# Patient Record
Sex: MALE | Race: WHITE | NOT HISPANIC OR LATINO | Employment: FULL TIME | URBAN - METROPOLITAN AREA
[De-identification: names, ages, dates, MRNs, and addresses within clinical notes are randomized per-mention and may not be internally consistent; named-entity substitution may affect disease eponyms.]

---

## 2024-08-27 ENCOUNTER — HOSPITAL ENCOUNTER (OUTPATIENT)
Facility: HOSPITAL | Age: 24
Discharge: HOME | End: 2024-08-28
Attending: EMERGENCY MEDICINE | Admitting: SURGERY

## 2024-08-27 DIAGNOSIS — S68.119A TRAUMATIC AMPUTATION OF FINGER TIP, INITIAL ENCOUNTER: Primary | ICD-10-CM

## 2024-08-27 DIAGNOSIS — S62.639B: ICD-10-CM

## 2024-08-27 PROCEDURE — 25000003 PHARM REV CODE 250: Performed by: EMERGENCY MEDICINE

## 2024-08-27 PROCEDURE — 99285 EMERGENCY DEPT VISIT HI MDM: CPT

## 2024-08-27 RX ORDER — OXYCODONE AND ACETAMINOPHEN 5; 325 MG/1; MG/1
1 TABLET ORAL
Status: COMPLETED | OUTPATIENT
Start: 2024-08-27 | End: 2024-08-27

## 2024-08-27 RX ORDER — CEFAZOLIN SODIUM 2 G/50ML
2 SOLUTION INTRAVENOUS
Status: DISCONTINUED | OUTPATIENT
Start: 2024-08-28 | End: 2024-08-28 | Stop reason: HOSPADM

## 2024-08-27 RX ADMIN — OXYCODONE HYDROCHLORIDE AND ACETAMINOPHEN 1 TABLET: 5; 325 TABLET ORAL at 10:08

## 2024-08-28 ENCOUNTER — ANESTHESIA EVENT (OUTPATIENT)
Dept: SURGERY | Facility: HOSPITAL | Age: 24
End: 2024-08-28

## 2024-08-28 ENCOUNTER — ANESTHESIA (OUTPATIENT)
Dept: SURGERY | Facility: HOSPITAL | Age: 24
End: 2024-08-28

## 2024-08-28 PROBLEM — S62.639B: Status: ACTIVE | Noted: 2024-08-28

## 2024-08-28 LAB
ALBUMIN SERPL-MCNC: 3.8 G/DL (ref 3.5–5)
ALBUMIN/GLOB SERPL: 1.5 RATIO (ref 1.1–2)
ALP SERPL-CCNC: 59 UNIT/L (ref 40–150)
ALT SERPL-CCNC: 23 UNIT/L (ref 0–55)
ANION GAP SERPL CALC-SCNC: 10 MEQ/L
AST SERPL-CCNC: 27 UNIT/L (ref 5–34)
BASOPHILS # BLD AUTO: 0.03 X10(3)/MCL
BASOPHILS NFR BLD AUTO: 0.4 %
BILIRUB SERPL-MCNC: 0.6 MG/DL
BUN SERPL-MCNC: 16.1 MG/DL (ref 8.9–20.6)
CALCIUM SERPL-MCNC: 9 MG/DL (ref 8.4–10.2)
CHLORIDE SERPL-SCNC: 106 MMOL/L (ref 98–107)
CO2 SERPL-SCNC: 24 MMOL/L (ref 22–29)
CREAT SERPL-MCNC: 0.9 MG/DL (ref 0.73–1.18)
CREAT/UREA NIT SERPL: 18
EOSINOPHIL # BLD AUTO: 0.14 X10(3)/MCL (ref 0–0.9)
EOSINOPHIL NFR BLD AUTO: 1.8 %
ERYTHROCYTE [DISTWIDTH] IN BLOOD BY AUTOMATED COUNT: 13.8 % (ref 11.5–17)
GFR SERPLBLD CREATININE-BSD FMLA CKD-EPI: >60 ML/MIN/1.73/M2
GLOBULIN SER-MCNC: 2.6 GM/DL (ref 2.4–3.5)
GLUCOSE SERPL-MCNC: 91 MG/DL (ref 74–100)
HCT VFR BLD AUTO: 39.8 % (ref 42–52)
HGB BLD-MCNC: 13.2 G/DL (ref 14–18)
IMM GRANULOCYTES # BLD AUTO: 0.03 X10(3)/MCL (ref 0–0.04)
IMM GRANULOCYTES NFR BLD AUTO: 0.4 %
LYMPHOCYTES # BLD AUTO: 2.18 X10(3)/MCL (ref 0.6–4.6)
LYMPHOCYTES NFR BLD AUTO: 28.6 %
MAGNESIUM SERPL-MCNC: 2.5 MG/DL (ref 1.6–2.6)
MCH RBC QN AUTO: 29.7 PG (ref 27–31)
MCHC RBC AUTO-ENTMCNC: 33.2 G/DL (ref 33–36)
MCV RBC AUTO: 89.4 FL (ref 80–94)
MONOCYTES # BLD AUTO: 0.7 X10(3)/MCL (ref 0.1–1.3)
MONOCYTES NFR BLD AUTO: 9.2 %
NEUTROPHILS # BLD AUTO: 4.53 X10(3)/MCL (ref 2.1–9.2)
NEUTROPHILS NFR BLD AUTO: 59.6 %
NRBC BLD AUTO-RTO: 0 %
PHOSPHATE SERPL-MCNC: 4.7 MG/DL (ref 2.3–4.7)
PLATELET # BLD AUTO: 252 X10(3)/MCL (ref 130–400)
PMV BLD AUTO: 10.3 FL (ref 7.4–10.4)
POTASSIUM SERPL-SCNC: 3.7 MMOL/L (ref 3.5–5.1)
PROT SERPL-MCNC: 6.4 GM/DL (ref 6.4–8.3)
RBC # BLD AUTO: 4.45 X10(6)/MCL (ref 4.7–6.1)
SODIUM SERPL-SCNC: 140 MMOL/L (ref 136–145)
WBC # BLD AUTO: 7.61 X10(3)/MCL (ref 4.5–11.5)

## 2024-08-28 PROCEDURE — 63600175 PHARM REV CODE 636 W HCPCS: Performed by: ANESTHESIOLOGY

## 2024-08-28 PROCEDURE — 99204 OFFICE O/P NEW MOD 45 MIN: CPT | Mod: 57,,, | Performed by: ORTHOPAEDIC SURGERY

## 2024-08-28 PROCEDURE — 63600175 PHARM REV CODE 636 W HCPCS: Performed by: PHYSICIAN ASSISTANT

## 2024-08-28 PROCEDURE — 63600175 PHARM REV CODE 636 W HCPCS

## 2024-08-28 PROCEDURE — G0378 HOSPITAL OBSERVATION PER HR: HCPCS

## 2024-08-28 PROCEDURE — 71000015 HC POSTOP RECOV 1ST HR: Performed by: ORTHOPAEDIC SURGERY

## 2024-08-28 PROCEDURE — 26951 AMPUTATION OF FINGER/THUMB: CPT | Mod: F9,,, | Performed by: ORTHOPAEDIC SURGERY

## 2024-08-28 PROCEDURE — 71000033 HC RECOVERY, INTIAL HOUR: Performed by: ORTHOPAEDIC SURGERY

## 2024-08-28 PROCEDURE — 63600175 PHARM REV CODE 636 W HCPCS: Mod: JZ,JG | Performed by: ORTHOPAEDIC SURGERY

## 2024-08-28 PROCEDURE — 37000009 HC ANESTHESIA EA ADD 15 MINS: Performed by: ORTHOPAEDIC SURGERY

## 2024-08-28 PROCEDURE — 25000003 PHARM REV CODE 250

## 2024-08-28 PROCEDURE — 37000008 HC ANESTHESIA 1ST 15 MINUTES: Performed by: ORTHOPAEDIC SURGERY

## 2024-08-28 PROCEDURE — 71000016 HC POSTOP RECOV ADDL HR: Performed by: ORTHOPAEDIC SURGERY

## 2024-08-28 PROCEDURE — 96365 THER/PROPH/DIAG IV INF INIT: CPT

## 2024-08-28 PROCEDURE — 96366 THER/PROPH/DIAG IV INF ADDON: CPT

## 2024-08-28 PROCEDURE — 36000706: Performed by: ORTHOPAEDIC SURGERY

## 2024-08-28 PROCEDURE — 36000707: Performed by: ORTHOPAEDIC SURGERY

## 2024-08-28 PROCEDURE — 36000705 HC OR TIME LEV I EA ADD 15 MIN: Performed by: ORTHOPAEDIC SURGERY

## 2024-08-28 PROCEDURE — 25000003 PHARM REV CODE 250: Performed by: ANESTHESIOLOGY

## 2024-08-28 PROCEDURE — 85025 COMPLETE CBC W/AUTO DIFF WBC: CPT

## 2024-08-28 PROCEDURE — 96372 THER/PROPH/DIAG INJ SC/IM: CPT

## 2024-08-28 PROCEDURE — 83735 ASSAY OF MAGNESIUM: CPT

## 2024-08-28 PROCEDURE — 63600175 PHARM REV CODE 636 W HCPCS: Performed by: EMERGENCY MEDICINE

## 2024-08-28 PROCEDURE — 36000704 HC OR TIME LEV I 1ST 15 MIN: Performed by: ORTHOPAEDIC SURGERY

## 2024-08-28 PROCEDURE — 80053 COMPREHEN METABOLIC PANEL: CPT

## 2024-08-28 PROCEDURE — 84100 ASSAY OF PHOSPHORUS: CPT

## 2024-08-28 RX ORDER — PROPOFOL 10 MG/ML
INJECTION, EMULSION INTRAVENOUS
Status: DISCONTINUED | OUTPATIENT
Start: 2024-08-28 | End: 2024-08-28

## 2024-08-28 RX ORDER — FENTANYL CITRATE 50 UG/ML
INJECTION, SOLUTION INTRAMUSCULAR; INTRAVENOUS
Status: DISCONTINUED | OUTPATIENT
Start: 2024-08-28 | End: 2024-08-28

## 2024-08-28 RX ORDER — CEFAZOLIN SODIUM 2 G/50ML
2 SOLUTION INTRAVENOUS ONCE
Status: COMPLETED | OUTPATIENT
Start: 2024-08-28 | End: 2024-08-28

## 2024-08-28 RX ORDER — GABAPENTIN 300 MG/1
300 CAPSULE ORAL 3 TIMES DAILY
Status: DISCONTINUED | OUTPATIENT
Start: 2024-08-28 | End: 2024-08-28 | Stop reason: HOSPADM

## 2024-08-28 RX ORDER — SODIUM CHLORIDE, SODIUM LACTATE, POTASSIUM CHLORIDE, CALCIUM CHLORIDE 600; 310; 30; 20 MG/100ML; MG/100ML; MG/100ML; MG/100ML
INJECTION, SOLUTION INTRAVENOUS CONTINUOUS
Status: DISCONTINUED | OUTPATIENT
Start: 2024-08-28 | End: 2024-08-28 | Stop reason: HOSPADM

## 2024-08-28 RX ORDER — KETOROLAC TROMETHAMINE 10 MG/1
10 TABLET, FILM COATED ORAL EVERY 6 HOURS PRN
Qty: 20 TABLET | Refills: 0 | Status: SHIPPED | OUTPATIENT
Start: 2024-08-28 | End: 2024-09-02

## 2024-08-28 RX ORDER — IPRATROPIUM BROMIDE AND ALBUTEROL SULFATE 2.5; .5 MG/3ML; MG/3ML
3 SOLUTION RESPIRATORY (INHALATION) ONCE AS NEEDED
Status: DISCONTINUED | OUTPATIENT
Start: 2024-08-28 | End: 2024-08-28 | Stop reason: HOSPADM

## 2024-08-28 RX ORDER — ACETAMINOPHEN 325 MG/1
650 TABLET ORAL EVERY 4 HOURS
Status: DISCONTINUED | OUTPATIENT
Start: 2024-08-28 | End: 2024-08-28 | Stop reason: HOSPADM

## 2024-08-28 RX ORDER — HYDROMORPHONE HYDROCHLORIDE 2 MG/ML
0.5 INJECTION, SOLUTION INTRAMUSCULAR; INTRAVENOUS; SUBCUTANEOUS EVERY 5 MIN PRN
Status: DISCONTINUED | OUTPATIENT
Start: 2024-08-28 | End: 2024-08-28 | Stop reason: HOSPADM

## 2024-08-28 RX ORDER — MIDAZOLAM HYDROCHLORIDE 1 MG/ML
INJECTION INTRAMUSCULAR; INTRAVENOUS
Status: DISCONTINUED | OUTPATIENT
Start: 2024-08-28 | End: 2024-08-28

## 2024-08-28 RX ORDER — MEPERIDINE HYDROCHLORIDE 25 MG/ML
12.5 INJECTION INTRAMUSCULAR; INTRAVENOUS; SUBCUTANEOUS ONCE
Status: DISCONTINUED | OUTPATIENT
Start: 2024-08-28 | End: 2024-08-28 | Stop reason: HOSPADM

## 2024-08-28 RX ORDER — METOCLOPRAMIDE HYDROCHLORIDE 5 MG/ML
10 INJECTION INTRAMUSCULAR; INTRAVENOUS EVERY 10 MIN PRN
Status: DISCONTINUED | OUTPATIENT
Start: 2024-08-28 | End: 2024-08-28 | Stop reason: HOSPADM

## 2024-08-28 RX ORDER — THIAMINE HCL 100 MG
100 TABLET ORAL DAILY
Status: DISCONTINUED | OUTPATIENT
Start: 2024-08-28 | End: 2024-08-28 | Stop reason: HOSPADM

## 2024-08-28 RX ORDER — DEXAMETHASONE SODIUM PHOSPHATE 4 MG/ML
INJECTION, SOLUTION INTRA-ARTICULAR; INTRALESIONAL; INTRAMUSCULAR; INTRAVENOUS; SOFT TISSUE
Status: DISCONTINUED | OUTPATIENT
Start: 2024-08-28 | End: 2024-08-28

## 2024-08-28 RX ORDER — ONDANSETRON HYDROCHLORIDE 2 MG/ML
INJECTION, SOLUTION INTRAVENOUS
Status: DISCONTINUED | OUTPATIENT
Start: 2024-08-28 | End: 2024-08-28

## 2024-08-28 RX ORDER — FOLIC ACID 1 MG/1
1 TABLET ORAL DAILY
Status: DISCONTINUED | OUTPATIENT
Start: 2024-08-28 | End: 2024-08-28 | Stop reason: HOSPADM

## 2024-08-28 RX ORDER — METOCLOPRAMIDE HYDROCHLORIDE 5 MG/ML
10 INJECTION INTRAMUSCULAR; INTRAVENOUS ONCE
Status: COMPLETED | OUTPATIENT
Start: 2024-08-28 | End: 2024-08-28

## 2024-08-28 RX ORDER — METHOCARBAMOL 500 MG/1
500 TABLET, FILM COATED ORAL EVERY 8 HOURS
Status: DISCONTINUED | OUTPATIENT
Start: 2024-08-28 | End: 2024-08-28 | Stop reason: HOSPADM

## 2024-08-28 RX ORDER — OXYCODONE HYDROCHLORIDE 5 MG/1
5 TABLET ORAL EVERY 4 HOURS PRN
Status: DISCONTINUED | OUTPATIENT
Start: 2024-08-28 | End: 2024-08-28 | Stop reason: HOSPADM

## 2024-08-28 RX ORDER — METHOCARBAMOL 750 MG/1
750 TABLET, FILM COATED ORAL 3 TIMES DAILY
Qty: 30 TABLET | Refills: 0 | Status: SHIPPED | OUTPATIENT
Start: 2024-08-28 | End: 2024-09-07

## 2024-08-28 RX ORDER — ENOXAPARIN SODIUM 100 MG/ML
40 INJECTION SUBCUTANEOUS EVERY 12 HOURS
Status: DISCONTINUED | OUTPATIENT
Start: 2024-08-28 | End: 2024-08-28 | Stop reason: HOSPADM

## 2024-08-28 RX ORDER — HYDROCODONE BITARTRATE AND ACETAMINOPHEN 10; 325 MG/1; MG/1
1 TABLET ORAL EVERY 4 HOURS PRN
Qty: 42 TABLET | Refills: 0 | Status: SHIPPED | OUTPATIENT
Start: 2024-08-28 | End: 2024-09-04

## 2024-08-28 RX ORDER — BUPIVACAINE HYDROCHLORIDE 5 MG/ML
INJECTION, SOLUTION EPIDURAL; INTRACAUDAL
Status: DISCONTINUED | OUTPATIENT
Start: 2024-08-28 | End: 2024-08-28 | Stop reason: HOSPADM

## 2024-08-28 RX ORDER — SODIUM CHLORIDE 9 MG/ML
INJECTION, SOLUTION INTRAVENOUS CONTINUOUS
Status: DISCONTINUED | OUTPATIENT
Start: 2024-08-28 | End: 2024-08-28 | Stop reason: HOSPADM

## 2024-08-28 RX ORDER — ACETAMINOPHEN 500 MG
1000 TABLET ORAL ONCE
Status: DISCONTINUED | OUTPATIENT
Start: 2024-08-28 | End: 2024-08-28

## 2024-08-28 RX ORDER — ONDANSETRON HYDROCHLORIDE 2 MG/ML
4 INJECTION, SOLUTION INTRAVENOUS ONCE
Status: DISCONTINUED | OUTPATIENT
Start: 2024-08-28 | End: 2024-08-28 | Stop reason: HOSPADM

## 2024-08-28 RX ORDER — ADHESIVE BANDAGE
30 BANDAGE TOPICAL DAILY PRN
Status: DISCONTINUED | OUTPATIENT
Start: 2024-08-28 | End: 2024-08-28 | Stop reason: HOSPADM

## 2024-08-28 RX ORDER — LIDOCAINE HYDROCHLORIDE 10 MG/ML
1 INJECTION, SOLUTION EPIDURAL; INFILTRATION; INTRACAUDAL; PERINEURAL ONCE
Status: DISCONTINUED | OUTPATIENT
Start: 2024-08-28 | End: 2024-08-28 | Stop reason: HOSPADM

## 2024-08-28 RX ORDER — DIPHENHYDRAMINE HYDROCHLORIDE 50 MG/ML
25 INJECTION INTRAMUSCULAR; INTRAVENOUS EVERY 6 HOURS PRN
Status: DISCONTINUED | OUTPATIENT
Start: 2024-08-28 | End: 2024-08-28 | Stop reason: HOSPADM

## 2024-08-28 RX ORDER — HYDROMORPHONE HYDROCHLORIDE 2 MG/ML
0.2 INJECTION, SOLUTION INTRAMUSCULAR; INTRAVENOUS; SUBCUTANEOUS EVERY 5 MIN PRN
Status: DISCONTINUED | OUTPATIENT
Start: 2024-08-28 | End: 2024-08-28 | Stop reason: HOSPADM

## 2024-08-28 RX ORDER — PHENYLEPHRINE HYDROCHLORIDE 10 MG/ML
INJECTION INTRAVENOUS
Status: DISCONTINUED | OUTPATIENT
Start: 2024-08-28 | End: 2024-08-28

## 2024-08-28 RX ORDER — FAMOTIDINE 10 MG/ML
20 INJECTION INTRAVENOUS ONCE
Status: COMPLETED | OUTPATIENT
Start: 2024-08-28 | End: 2024-08-28

## 2024-08-28 RX ORDER — LIDOCAINE HYDROCHLORIDE 20 MG/ML
INJECTION, SOLUTION EPIDURAL; INFILTRATION; INTRACAUDAL; PERINEURAL
Status: DISCONTINUED | OUTPATIENT
Start: 2024-08-28 | End: 2024-08-28

## 2024-08-28 RX ORDER — MIDAZOLAM HYDROCHLORIDE 2 MG/2ML
2 INJECTION, SOLUTION INTRAMUSCULAR; INTRAVENOUS ONCE AS NEEDED
Status: DISCONTINUED | OUTPATIENT
Start: 2024-08-28 | End: 2024-08-28 | Stop reason: HOSPADM

## 2024-08-28 RX ORDER — POLYETHYLENE GLYCOL 3350 17 G/17G
17 POWDER, FOR SOLUTION ORAL 2 TIMES DAILY
Status: DISCONTINUED | OUTPATIENT
Start: 2024-08-28 | End: 2024-08-28 | Stop reason: HOSPADM

## 2024-08-28 RX ORDER — LORAZEPAM 0.5 MG/1
1 TABLET ORAL EVERY 4 HOURS PRN
Status: DISCONTINUED | OUTPATIENT
Start: 2024-08-28 | End: 2024-08-28 | Stop reason: HOSPADM

## 2024-08-28 RX ORDER — ACETAMINOPHEN 500 MG
500 TABLET ORAL ONCE
Status: DISCONTINUED | OUTPATIENT
Start: 2024-08-28 | End: 2024-08-28 | Stop reason: HOSPADM

## 2024-08-28 RX ORDER — TALC
6 POWDER (GRAM) TOPICAL NIGHTLY PRN
Status: DISCONTINUED | OUTPATIENT
Start: 2024-08-28 | End: 2024-08-28 | Stop reason: HOSPADM

## 2024-08-28 RX ORDER — OXYCODONE HYDROCHLORIDE 5 MG/1
10 TABLET ORAL EVERY 4 HOURS PRN
Status: DISCONTINUED | OUTPATIENT
Start: 2024-08-28 | End: 2024-08-28 | Stop reason: HOSPADM

## 2024-08-28 RX ORDER — HYDROCODONE BITARTRATE AND ACETAMINOPHEN 5; 325 MG/1; MG/1
1 TABLET ORAL
Status: DISCONTINUED | OUTPATIENT
Start: 2024-08-28 | End: 2024-08-28

## 2024-08-28 RX ADMIN — GABAPENTIN 300 MG: 300 CAPSULE ORAL at 09:08

## 2024-08-28 RX ADMIN — PROPOFOL 130 MG: 10 INJECTION, EMULSION INTRAVENOUS at 02:08

## 2024-08-28 RX ADMIN — MIDAZOLAM HYDROCHLORIDE 2 MG: 1 INJECTION, SOLUTION INTRAMUSCULAR; INTRAVENOUS at 02:08

## 2024-08-28 RX ADMIN — THERA TABS 1 TABLET: TAB at 09:08

## 2024-08-28 RX ADMIN — METOCLOPRAMIDE 10 MG: 5 INJECTION, SOLUTION INTRAMUSCULAR; INTRAVENOUS at 02:08

## 2024-08-28 RX ADMIN — PHENYLEPHRINE HYDROCHLORIDE 100 MCG: 10 INJECTION INTRAVENOUS at 02:08

## 2024-08-28 RX ADMIN — CEFAZOLIN SODIUM 2 G: 2 SOLUTION INTRAVENOUS at 09:08

## 2024-08-28 RX ADMIN — CEFAZOLIN SODIUM 2 G: 2 SOLUTION INTRAVENOUS at 12:08

## 2024-08-28 RX ADMIN — LIDOCAINE HYDROCHLORIDE 50 MG: 20 INJECTION, SOLUTION INTRAVENOUS at 02:08

## 2024-08-28 RX ADMIN — FOLIC ACID 1 MG: 1 TABLET ORAL at 09:08

## 2024-08-28 RX ADMIN — ONDANSETRON 4 MG: 2 INJECTION INTRAMUSCULAR; INTRAVENOUS at 02:08

## 2024-08-28 RX ADMIN — THIAMINE HCL TAB 100 MG 100 MG: 100 TAB at 09:08

## 2024-08-28 RX ADMIN — CEFAZOLIN SODIUM 2 G: 2 SOLUTION INTRAVENOUS at 02:08

## 2024-08-28 RX ADMIN — ENOXAPARIN SODIUM 40 MG: 40 INJECTION SUBCUTANEOUS at 12:08

## 2024-08-28 RX ADMIN — DEXAMETHASONE SODIUM PHOSPHATE 4 MG: 4 INJECTION, SOLUTION INTRA-ARTICULAR; INTRALESIONAL; INTRAMUSCULAR; INTRAVENOUS; SOFT TISSUE at 02:08

## 2024-08-28 RX ADMIN — DOCUSATE SODIUM 100 MG: 100 CAPSULE, LIQUID FILLED ORAL at 09:08

## 2024-08-28 RX ADMIN — OXYCODONE HYDROCHLORIDE 5 MG: 5 TABLET ORAL at 09:08

## 2024-08-28 RX ADMIN — FAMOTIDINE 20 MG: 10 INJECTION, SOLUTION INTRAVENOUS at 02:08

## 2024-08-28 RX ADMIN — ENOXAPARIN SODIUM 40 MG: 40 INJECTION SUBCUTANEOUS at 09:08

## 2024-08-28 RX ADMIN — POLYETHYLENE GLYCOL 3350 17 G: 17 POWDER, FOR SOLUTION ORAL at 09:08

## 2024-08-28 RX ADMIN — FENTANYL CITRATE 50 MCG: 50 INJECTION, SOLUTION INTRAMUSCULAR; INTRAVENOUS at 02:08

## 2024-08-28 RX ADMIN — SODIUM CHLORIDE, POTASSIUM CHLORIDE, SODIUM LACTATE AND CALCIUM CHLORIDE: 600; 310; 30; 20 INJECTION, SOLUTION INTRAVENOUS at 12:08

## 2024-08-28 RX ADMIN — SODIUM CHLORIDE, SODIUM GLUCONATE, SODIUM ACETATE, POTASSIUM CHLORIDE AND MAGNESIUM CHLORIDE: 526; 502; 368; 37; 30 INJECTION, SOLUTION INTRAVENOUS at 02:08

## 2024-08-28 RX ADMIN — ACETAMINOPHEN 650 MG: 325 TABLET ORAL at 09:08

## 2024-08-28 NOTE — TRANSFER OF CARE
"Anesthesia Transfer of Care Note    Patient: Hugo Santacruz    Procedure(s) Performed: Procedure(s) (LRB):  REVISION AMPUTATION RIGHT 5TH FINGER (Right)    Patient location: PACU    Anesthesia Type: general    Transport from OR: Transported from OR on room air with adequate spontaneous ventilation    Post pain: adequate analgesia    Post assessment: no apparent anesthetic complications and tolerated procedure well    Post vital signs: stable    Level of consciousness: sedated    Nausea/Vomiting: no nausea/vomiting    Complications: none    Transfer of care protocol was followed      Last vitals: Visit Vitals  /61 (BP Location: Left arm, Patient Position: Lying)   Pulse (!) 54   Temp 36.7 °C (98.1 °F)   Resp 14   Ht 6' 4" (1.93 m)   Wt 72.6 kg (160 lb)   SpO2 97%   BMI 19.48 kg/m²     "

## 2024-08-28 NOTE — ANESTHESIA PROCEDURE NOTES
Intubation    Date/Time: 8/28/2024 2:12 PM    Performed by: Dustin Carranza CRNA  Authorized by: Mitch Smith DO    Intubation:     Induction:  Intravenous    Mask Ventilation:  Not attempted    Attempts:  1    Attempted By:  CRNA    Method of Intubation:  Other (see comments)    Difficult Airway Encountered?: No      Complications:  None    Airway Device:  Supraglottic airway/LMA    Placement Verified By:  Capnometry    Complicating Factors:  None    Findings Post-Intubation:  BS equal bilateral and atraumatic/condition of teeth unchanged  Notes:      LMA size #4

## 2024-08-28 NOTE — OP NOTE
OPERATIVE REPORT      Patient: Hugo Santacruz   : 2000    MRN: [id number]  Date: 2024      Surgeon:Nakul Ruiz DO  Assistant: Edi Petersen was essential, part of the procedure including deep hardware placement and deep closure.  No senior assistant was availible  Preoperative Diagnosis:  Right small finger partial amputation  Postoperative Diagnosis: Same  Procedure:    Revision amputation right small finger-CPT 2695  Anesthesiologist: Mitch Smith DO  OR Staff: Circulator: Maria Antonia Turk RN  Scrub Person: Julian Bautista  Implants: * No implants in log *  EBL: 20cc  Complications: None  Disposition: To PACU, stable    Indications: Hugo Santacrzu is a 23 y.o. male presenting with the aforementioned injuries/findings. The procedure is indicated to decrease risk of infection.  The patient is awake and alert. After thorough discussion of the risks, benefits, expected outcomes, and alternatives to surgical intervention, the patient agreed to proceed with surgical treatment.  Specific risks discussed included, but were not limited to: superficial or deep infection, wound healing complications, DVT/PE, significant bleeding requiring transfusion, damage to named anatomic structures in the immediate area including named neurovascular structures, infection, nonunion, malunion and general risks of anesthesia.  Major risk of iatrogenic fracture and return of arthrofibrosis.  The patient voiced understanding and written as well as verbal consent was obtained by myself prior to the procedure.    Procedure Note:  The patient was brought back to the OR and placed supine on the OR table. After successful induction of anesthesia by anesthesia staff, the patient was positioned in the supine position and all bony prominences were padded appropriately.  The surgical field was then provisionally cleansed and then prepped and draped in the usual sterile fashion.    At this time a time-out was  performed, with the correct patient, site, and procedure identified.  The universal time out as well as sign your site protocols were followed.  Preoperative antibiotics were verified as administered.     Attention is drawn to the right small finger.  Patient has a short oblique traumatic laceration over the distal phalanx with open fracture.  This is proximal to the germinal matrix.  We cleaned the wound with copious amounts of saline I then skeletonized the distal phalanx removed from the field.  I then ablated the germinal matrix to ensure no nail growth.  We then cleaned up skin edges placed vancomycin deep in the wound and completed completed our closure.  Cartilage was removed from the middle phalanx on the distal aspect.    We proceeded with a digital nerve block to provide postoperative pain control    The incision(s) was/were then irrigated using copious sterile saline and then vancomyocin was added to the wound bed for prophylaxis. The surgical wound was closed in layered fashion.  The surgical site(s) was/were were sterilely cleansed and dressed.    The patient was then subsequently transferred to to PACU in a stable condition.    All sponge and needle counts were correct at the end of the case.  I was present and participated in all aspects of the procedure.    Prognosis:  The patient will be kept NWB on the ipsilateral extremity for approximately 4-6 weeks .  Patient will receive oral antibiotics for discharge.  Patient may need return to the OR for excisional debridement or washout if infection/skin necrosis is observed.      This note/OR report was created with the assistance of  voice recognition software or phone  dictation.  There may be transcription errors as a result of using this technology however minimal. Effort has been made to assure accuracy of transcription but any obvious errors or omissions should be clarified with the author of the document.       Nakul Ruiz,   Orthopedic Trauma  Surgery

## 2024-08-28 NOTE — TRAUMA COM
Trauma Com    Evaluated on rounds . Resting comforting at time of evaluation. NPO for OR with Ortho    Continue abx  OR with Ortho   Anticipate discharge post op     MARY KATE SifuentesSolomon Carter Fuller [facility] Center-BC   Trauma/Acute Care Surgery  [facility]

## 2024-08-28 NOTE — DISCHARGE INSTRUCTIONS
-NO driving and NO alcohol consumption for 24 hours and while taking narcotic pain medication.    -Wound care: Remove dressing in 48 hours. Begin daily dry dressing changes on post-op day 2. May cover with soft dressing or large band aid. Keep clean and dry. No showers to post-op day 7. Do not submerge. Do not apply ointments or creams.     -Weight bearing status: non-weight bearing for 4-6 weeks. Keep splint in place.    -Monitor for signs of INFECTION: redness, swelling, drainage/pus/foul odor, fever, chills.    -Monitor extremity for good circulation (pink, warm, etc). If you received a nerve block, it can last 8-12 hours.    -Apply ICE and ELEVATE extremity as needed to aid in pain and inflammation.    -Report to your nearest ER/Notify your doctor if you experience any SUDDEN/SEVERE chest pain/abdominal pain, weakness, trouble breathing, or uncontrolled pain.     BLEEDING: if you experience uncontrollable bleeding, contact your doctor and go to ER.    NAUSEA: due to the anesthesia, you may experience nausea for up to 24 hours. If nausea and vomiting last longer, contact your doctor.     INFECTION:  watch for any signs or symptoms of infection such as chills, fever, redness or drainage at surgical site. Notify your doctor.     PAIN : take your pain medications as directed. If the pain medications are not helping, notify your doctor.

## 2024-08-28 NOTE — ED PROVIDER NOTES
Encounter Date: 8/27/2024       History     Chief Complaint   Patient presents with    Transfer     Transfer from [facility] [place] -  5th digit amputation     23-year-old male presents to the emergency department as transfer from [facility] for evaluation of amputation of the right little finger sustained after finger caught in equipment at work.   He was given 1 g Ancef, tetanus immunization updated and 1 mg of Dilaudid given prior to transfer.    X-ray of the previous facility with traumatic amputation of the right 5th tuft with overlying soft tissue defect and exposed bone.  No radiodense foreign bodies.    WBC 11.1, hemoglobin 15.3, hematocrit 46.2, platelets 365   Sodium 141, potassium 3.2, chloride 99, CO2 27, BUN 18, creatinine 1.28      The history is provided by the patient and medical records.     Review of patient's allergies indicates:   Allergen Reactions    Pcn [penicillins] Rash     Past Medical History:   Diagnosis Date    Asthma      Past Surgical History:   Procedure Laterality Date              MENISCECTOMY Right      No family history on file.     Review of Systems   Constitutional:  Negative for fever.   Skin:  Positive for wound.       Physical Exam     Initial Vitals [08/27/24 2156]   BP Pulse Resp Temp SpO2   128/60 85 16 98.1 °F (36.7 °C) 99 %      MAP       --         Physical Exam    Nursing note and vitals reviewed.  Constitutional: He appears well-developed and well-nourished. No distress.   HENT:   Head: Normocephalic and atraumatic.   Eyes: Conjunctivae are normal. No scleral icterus.   Cardiovascular:  Normal rate, regular rhythm and intact distal pulses.           Musculoskeletal:      Comments: Right 5th digit tuft amputation, + exposed bone     Neurological: He has normal strength.   Skin: Skin is warm and dry.                   ED Course   Procedures  Labs Reviewed   CBC WITH DIFFERENTIAL - Abnormal       Result Value    WBC 7.61      RBC 4.45 (*)     Hgb 13.2 (*)     Hct 39.8 (*)     MCV  Refill can not be authorize until he comes to see Dr Ginger Oliver, patient's son informed and agrees with his decision. 89.4      MCH 29.7      MCHC 33.2      RDW 13.8      Platelet 252      MPV 10.3      Neut % 59.6      Lymph % 28.6      Mono % 9.2      Eos % 1.8      Basophil % 0.4      Lymph # 2.18      Neut # 4.53      Mono # 0.70      Eos # 0.14      Baso # 0.03      IG# 0.03      IG% 0.4      NRBC% 0.0     MAGNESIUM - Normal    Magnesium Level 2.50     PHOSPHORUS - Normal    Phosphorus Level 4.7     CBC W/ AUTO DIFFERENTIAL    Narrative:     The following orders were created for panel order CBC auto differential.  Procedure                               Abnormality         Status                     ---------                               -----------         ------                     CBC with Differential[[id number]]       Abnormal            Final result                 Please view results for these tests on the individual orders.   COMPREHENSIVE METABOLIC PANEL    Sodium 140      Potassium 3.7      Chloride 106      CO2 24      Glucose 91      Blood Urea Nitrogen 16.1      Creatinine 0.90      Calcium 9.0      Protein Total 6.4      Albumin 3.8      Globulin 2.6      Albumin/Globulin Ratio 1.5      Bilirubin Total 0.6      ALP 59      ALT 23      AST 27      eGFR >60      Anion Gap 10.0      BUN/Creatinine Ratio 18            Imaging Results    None          Medications   oxyCODONE-acetaminophen 5-325 mg per tablet 1 tablet (1 tablet Oral Given 8/27/24 2246)   cefazolin (ANCEF) 2 gram in dextrose 5% 50 mL IVPB (premix) (2 g Intravenous New Bag 8/28/24 1418)   famotidine (PF) injection 20 mg (20 mg Intravenous Given 8/28/24 1445)   metoclopramide injection 10 mg (10 mg Intravenous Given 8/28/24 1403)     Medical Decision Making  Problems Addressed:  Open avulsion fracture of distal phalanx of finger: acute illness or injury  Traumatic amputation of finger tip, initial encounter: acute illness or injury    Risk  Prescription drug management.      ED assessment:    Mr. Santacruz was transferred here for orthopedic surgery/hand  surgery consultation after fingertip amputation today. Given antibiotics, analgesics at prior facility.     Differential diagnosis (including but not limited to):   Fingertip amputation, open phalanx fracture    ED management:   I reviewed the workup from the previous facility and discussed with Orthopedic surgery on-call who agrees to follow in consultation.  Case discussed with the Trauma Service who agrees to admit under observation with plan for revision amputation in a.m..      Amount and/or Complexity of Data Reviewed  Independent historian: none   Summary of history:   External data reviewed: transfer records and prior imaging  Summary of data reviewed:   Open tuft fracture on OSH XR. Given abx, analgesics prior to transfer    Discussion of management or test interpretation with external provider(s): discussed with orthopedic surgery, trauma surgery consultant   Summary of discussion: as below. Discussed with trauma who accepts for admission    Risk  Prescription drug management   Decision regarding hospitalization  Shared decision making     Critical Care  none    I, Reyna Quiñones MD personally performed the history, PE, MDM, and procedures as documented above and agree with the scribe's documentation.              ED Course as of 08/30/24 1825 Tue Aug 27, 2024   0963 Discussed with orthopedics on-call, agrees to follow in consultation with plan for revision amputation.  Due to busy OR schedule, may not happen tomorrow.  Patient informed.  Trauma service contacted who agrees with admit.  Will plan for scheduled q.8 hours IV antibiotics, wet-to-dry dressings, analgesics as needed. [KS]      ED Course User Index  [KS] Reyna Quiñones MD                           Clinical Impression:  Final diagnoses:  [S62.699B] Open avulsion fracture of distal phalanx of finger  [S68.119A] Traumatic amputation of finger tip, initial encounter (Primary)          ED Disposition Condition    Observation                  Reyna Quiñones MD  08/30/24 1821

## 2024-08-28 NOTE — H&P
Trauma Surgery   History and Physical Note    Patient Name: Hugo Santacruz  YOB: 2000  Date: 08/28/2024 12:04 AM  Date of Admission: 8/27/2024  HD#0  POD#* No surgery found *    PRESENTING HISTORY   Chief Complaint/Reason for Admission: Open avulsion fracture of distal phalanx of finger    History of Present Illness:  Patient is a 23 year old male who presents to the ED as a transfer from [facility] for hand injury. He was at work and sustained an amputation of his distal phalanx of his right 5th digit on a hydraulic machine. He denies any injection type injury. Was given ancef at outside facility.     Review of Systems:  12 point ROS negative except as stated in HPI    PAST HISTORY:   Past medical history:  Denies.    Past surgical history:  No past surgical history on file.    Family history:  No family history on file.    Social history:   Cigarette and chewing tobacco use. Daily alcohol use. Previous drug use - last use over one year ago.   Social History     Tobacco Use   Smoking Status Not on file   Smokeless Tobacco Not on file      Social History     Substance and Sexual Activity   Alcohol Use Not on file        MEDICATIONS & ALLERGIES:   Allergies: Review of patient's allergies indicates:  No Known Allergies  Home Meds: No current outpatient medications   No current facility-administered medications on file prior to encounter.     No current outpatient medications on file prior to encounter.      No current facility-administered medications on file prior to encounter.     No current outpatient medications on file prior to encounter.     Scheduled Meds:   acetaminophen  650 mg Oral Q4H    ceFAZolin (Ancef) IV (PEDS and ADULTS)  2 g Intravenous Q8H    docusate sodium  100 mg Oral BID    enoxparin  40 mg Subcutaneous Q12H    gabapentin  300 mg Oral TID    methocarbamoL  500 mg Oral Q8H    polyethylene glycol  17 g Oral BID     Continuous Infusions:   lactated ringers   Intravenous Continuous      "    PRN Meds:  Current Facility-Administered Medications:     magnesium hydroxide 400 mg/5 ml, 30 mL, Oral, Daily PRN    melatonin, 6 mg, Oral, Nightly PRN    oxyCODONE, 5 mg, Oral, Q4H PRN    oxyCODONE, 10 mg, Oral, Q4H PRN    OBJECTIVE:   Vital Signs:  VITAL SIGNS: 24 HR MIN & MAX LAST   Temp  Min: 98.1 °F (36.7 °C)  Max: 98.1 °F (36.7 °C)  98.1 °F (36.7 °C)   BP  Min: 128/60  Max: 128/60  128/60    Pulse  Min: 85  Max: 85  85    Resp  Min: 12  Max: 16  12    SpO2  Min: 99 %  Max: 99 %  99 %      HT: 6' 4" (193 cm)  WT: 72.6 kg (160 lb)  BMI: 19.5   Intake/output: No intake/output data recorded.     Lines/drains/airway:       Physical Exam:  General:  Well developed, well nourished, no acute distress  HEENT:  Normocephalic, atraumatic  CV:  RR, 2+ DPs bilaterally  Resp/chest: NWOB  GI:  Abdomen soft, non-tender, non-distended  :  Deferred  MSK:  No muscle atrophy, cyanosis, peripheral edema, moving all extremities spontaneously, distal amputation of right 5th digit with exposed bone  Neuro: GCS 15. CNII-XII grossly intact, alert and oriented to person, place, and time. Strength and motor function grossly intact to all extremities, sensation intact to all extremities.  Skin/Wounds:  warm, dry    Labs:  OSH labs reviewed.     Diagnostic Results:  XR Right Hand: Amputation of distal 5th digit above DIP joint by my read.     ASSESSMENT & PLAN:      Distal right 5th digit amputation  - NPO  - mIVF @ 100 ml/h  - Daily labs   - MM pain control  - Frequent IS  - Prophylactic Lovenox 40mg BID   - Orthopedics consulted, planning for surgical formalization of amputation   - Ancef q8h    JAYLA Eagle-BC, FNP-BC  Trauma Surgery  [facility]  C: [phone]    "

## 2024-08-28 NOTE — NURSING
Admission documentation completed by the admit nurse. Home med rec complete. Pt did elect for meds to bed with [facility]. 4 Eyes to be completed by the admitting floor nurse. Audit-C completed with consult to  orders in.

## 2024-08-28 NOTE — DISCHARGE SUMMARY
[facility] - Periop Services  Discharge Note  Short Stay    Procedure(s) (LRB):  REVISION AMPUTATION RIGHT 5TH FINGER (Right)      OUTCOME: Patient tolerated treatment/procedure well without complication and is now ready for discharge.    DISPOSITION: Home or Self Care    FINAL DIAGNOSIS:  Open avulsion fracture of distal phalanx of finger    FOLLOWUP: In clinic    DISCHARGE INSTRUCTIONS:    Discharge Procedure Orders   Notify your health care provider if you experience any of the following:  temperature >100.4     Notify your health care provider if you experience any of the following:  persistent nausea and vomiting or diarrhea     Notify your health care provider if you experience any of the following:  severe uncontrolled pain     Notify your health care provider if you experience any of the following:  redness, tenderness, or signs of infection (pain, swelling, redness, odor or green/yellow discharge around incision site)     Remove dressing in 48 hours   Order Comments: Begin daily dry dressing changes POD2. May cover with soft dressing or large band aid. Keep clean and dry. No showers to POD 7. Do not submerge. Do not apply ointments or creams.     Weight bearing restrictions (specify):   Order Comments: WBAT RUE. Keep splint to 5th finger        TIME SPENT ON DISCHARGE: 15 minutes    Sushma Petersen PA-C  [facility]   Orthopedic Trauma

## 2024-08-28 NOTE — CONSULTS
[facility] - Emergency Dept  Orthopedic Trauma  Consult Note    Patient Name: Hugo Santacruz  MRN: [id number]  Admission Date: 8/27/2024  Hospital Length of Stay: 0 days  Attending Provider: Stanton Aranda MD  Primary Care Provider: No primary care provider on file.        Inpatient consult to Orthopedic Surgery  Consult performed by: Nakul Ruiz DO  Consult ordered by: Reyna Quiñones MD        Subjective:         Chief Complaint:   Chief Complaint   Patient presents with    Transfer     Transfer from [facility] 5th digit amputation        HPI:  Patient was transferred in from for a right 5th finger crush injury partial amputation.  He has dull achy pain in this area no numbness no tingling proximal to the injury.  He is oral nicotine user.    No past medical history on file.    No past surgical history on file.    Review of patient's allergies indicates:  No Known Allergies    Current Facility-Administered Medications   Medication    acetaminophen tablet 650 mg    cefazolin (ANCEF) 2 gram in dextrose 5% 50 mL IVPB (premix)    docusate sodium capsule 100 mg    enoxaparin injection 40 mg    folic acid tablet 1 mg    gabapentin capsule 300 mg    lactated ringers infusion    LORazepam tablet 1 mg    magnesium hydroxide 400 mg/5 ml suspension 2,400 mg    melatonin tablet 6 mg    methocarbamoL tablet 500 mg    multivitamin tablet    oxyCODONE immediate release tablet 5 mg    oxyCODONE immediate release tablet Tab 10 mg    polyethylene glycol packet 17 g    thiamine tablet 100 mg     No current outpatient medications on file.     Family History    None       Tobacco Use    Smoking status: Not on file    Smokeless tobacco: Not on file   Substance and Sexual Activity    Alcohol use: Not on file    Drug use: Not on file    Sexual activity: Not on file       ROS:  Constitutional: Denies fever chills  Eyes: No change in vision  ENT: No ringing or current infections  CV: No chest pain  Resp: No labored  "breathing  MSK: Pain evident at site of injury located in HPI,   Integ: No signs of abrasions or lacerations  Neuro: No numbness or tingling  Lymphatic: No swelling outside the area of injury   Objective:     Vital Signs (Most Recent):  Temp: 98.1 °F (36.7 °C) (08/27/24 2156)  Pulse: (!) 48 (08/28/24 0625)  Resp: 12 (08/28/24 0625)  BP: (!) 102/59 (08/28/24 0626)  SpO2: 100 % (08/28/24 0625) Vital Signs (24h Range):  Temp:  [98.1 °F (36.7 °C)] 98.1 °F (36.7 °C)  Pulse:  [45-85] 48  Resp:  [11-16] 12  SpO2:  [96 %-100 %] 100 %  BP: ()/(57-60) 102/59     Weight: 72.6 kg (160 lb)  Height: 6' 4" (193 cm)  Body mass index is 19.48 kg/m².    No intake or output data in the 24 hours ending 08/28/24 0700    Ortho/SPM Exam  General the patient is alert and oriented x3 no acute distress nontoxic-appearing appropriate affect.    Constitutional: Vital signs are examined and stable.  Resp: No signs of labored breathing                RUE: -Skin:  Immediate pictures in chart.  Patient has a partial amputation to the distal aspect of the 5th finger           -MSK: STR 5/5 AIN/PIN/Median/Radial/Ulnar motor,           -Neuro:  Sensation intact to light touch C5-T1 dermatomes           -Lymphatic: No signs of  lymphadenopathy,            -CV:  Capillary refill is less than 2 seconds. Radial and ulnar pulses 2/4. Compartments soft and compressible                 Significant Labs:  I have reviewed all labs in relation to Orthopedics  Recent Lab Results         08/28/24  0425        Albumin/Globulin Ratio 1.5       Albumin 3.8       ALP 59       ALT 23       Anion Gap 10.0       AST 27       Baso # 0.03       Basophil % 0.4       BILIRUBIN TOTAL 0.6       BUN 16.1       BUN/CREAT RATIO 18       Calcium 9.0       Chloride 106       CO2 24       Creatinine 0.90       eGFR >60       Eos # 0.14       Eos % 1.8       Globulin, Total 2.6       Glucose 91       Hematocrit 39.8       Hemoglobin 13.2       Immature Grans (Abs) 0.03       " Immature Granulocytes 0.4       Lymph # 2.18       LYMPH % 28.6       Magnesium  2.50       MCH 29.7       MCHC 33.2       MCV 89.4       Mono # 0.70       Mono % 9.2       MPV 10.3       Neut # 4.53       Neut % 59.6       nRBC 0.0       Phosphorus Level 4.7       Platelet Count 252       Potassium 3.7       PROTEIN TOTAL 6.4       RBC 4.45       RDW 13.8       Sodium 140       WBC 7.61               Significant Imaging: I have reviewed all pertinent imaging results/findings.  No results found.     Assessment/Plan:     Active Diagnoses:    Diagnosis Date Noted POA    PRINCIPAL PROBLEM:  Open avulsion fracture of distal phalanx of finger [S62.639B] 08/28/2024 Yes      Problems Resolved During this Admission:       Independent Radiology ordered by other provider:  Two views right hand skeletally mature individual shows a right 5th partial amputation soft tissue loss over the 5th distal phalanx.      Pt has acute injury with risk of severe bodily function with their injury right 5th finger.     -Risks included with this type of injury amputation    Patient had a crush injury to the right 5th distal phalanx of the hand.  Patient has a partial avulsion skeletal fixation of the distal phalanx.  We have discussed revision amputation in the risks with this.  We also discussed the risks of nicotine which includes nonunion malunion infection wound healing complications poor outcome.  Long with the risks stated below.  Patient understands these risks and we would like to proceed with surgery today.    I explained that surgery and the nature of their condition are not without risks. These include, but are not limited to, bleeding, infection, neurovascular compromise, malunion, nonunion, hardware complications, wound complications, scarring, cosmetic defects, need for later and/or repeated surgeries, avascular necrosis, bone death due to initial trauma, pain, loss of ROM, loss of function, PTOA, deformity, stance/gait and/or  functional abnormalities, thromboembolic complications, compartment syndrome, loss of limb, loss of life, anesthetic complications, and other imponderables. I explained that these can occur despite the adequacy of treatments rendered, and that their risks are heightened given the nature of their condition.  I have also discussed the importance not using nicotine products due to the increased risk of infection surgical wound healing complications and nonunion of the fracture.  They verbalized understanding.  No guarantees were made.  They would like to continue with surgery at this time. If appropriate family was involved with surgical discussion.       This note/OR report was created with the assistance of  voice recognition software or phone  dictation.  There may be transcription errors as a result of using this technology however minimal. Effort has been made to assure accuracy of transcription but any obvious errors or omissions should be clarified with the author of the document.       Nakul Ruiz,    Orthopedic Trauma Surgery  [facility] - Emergency Dept

## 2024-08-29 VITALS
SYSTOLIC BLOOD PRESSURE: 112 MMHG | OXYGEN SATURATION: 100 % | HEIGHT: 76 IN | BODY MASS INDEX: 19.48 KG/M2 | RESPIRATION RATE: 19 BRPM | TEMPERATURE: 98 F | HEART RATE: 64 BPM | DIASTOLIC BLOOD PRESSURE: 74 MMHG | WEIGHT: 160 LBS

## 2024-09-25 ENCOUNTER — TELEPHONE (OUTPATIENT)
Dept: ORTHOPEDICS | Facility: CLINIC | Age: 24
End: 2024-09-25

## 2024-09-26 ENCOUNTER — OFFICE VISIT (OUTPATIENT)
Dept: ORTHOPEDICS | Facility: CLINIC | Age: 24
End: 2024-09-26

## 2024-09-26 VITALS — SYSTOLIC BLOOD PRESSURE: 107 MMHG | DIASTOLIC BLOOD PRESSURE: 71 MMHG | HEART RATE: 78 BPM

## 2024-09-26 DIAGNOSIS — S62.639B OPEN AVULSION FRACTURE OF DISTAL PHALANX OF FINGER, INITIAL ENCOUNTER: Primary | ICD-10-CM

## 2024-09-26 PROCEDURE — 99024 POSTOP FOLLOW-UP VISIT: CPT | Mod: ,,, | Performed by: PHYSICIAN ASSISTANT

## 2024-09-26 NOTE — PROGRESS NOTES
"Subjective:       Patient ID: Hugo Santacruz is a 24 y.o. male.  Chief Complaint   Patient presents with    Right Hand - Post-op Evaluation     4 week f/u from right 5th digit revision amputation. No complaints. Sutures removed in half-way.         HPI    Patient presents for 4 week follow up 5th digit revision amputation. Incarcerated at this time. States nurse in half-way removed sutures. Skin flap with some evidence of necrosis today. Stable appearance, no erythema, no drainage. No fevers or chills. Does not have splint in place. Sensitivity to tip of digit    ROS:  Constitutional: Denies fever chills  Eyes: No change in vision  ENT: No ringing or current infections  CV: No chest pain  Resp: No labored breathing  MSK: Pain evident at site of injury located in HPI,   Integ: No signs of abrasions or lacerations  Neuro: No numbness or tingling  Lymphatic: No swelling outside the area of injury     No current outpatient medications on file prior to visit.     No current facility-administered medications on file prior to visit.          Objective:      /71   Pulse 78   Physical Exam  General the patient is alert and oriented x3 no acute distress nontoxic-appearing appropriate affect.    Constitutional: Vital signs are examined and stable.  Resp: No signs of labored breathing              RUE: -Skin: flap over distal aspect of the right 5th digit. Skin flap with evidence of necrosis along the incision line            -MSK: STR 5/5 AIN/PIN/Median/Radial/Ulnar motor,           -Neuro:  Sensation intact to light touch C5-T1 dermatomes           -Lymphatic: No signs of  lymphadenopathy,            -CV:  Capillary refill is less than 2 seconds. Radial and ulnar pulses 2/4. Compartments soft and compressible          There is no height or weight on file to calculate BMI.  Patient weight not recorded  No results found for: "HGBA1C"  Hgb   Date Value Ref Range Status   08/28/2024 13.2 (L) 14.0 - 18.0 g/dL Final     Hct   Date " "Value Ref Range Status   08/28/2024 39.8 (L) 42.0 - 52.0 % Final     No results found for: "IRON"  No components found for: "FROLATE"  No results found for: "DEIGFACU45YL"  WBC   Date Value Ref Range Status   08/28/2024 7.61 4.50 - 11.50 x10(3)/mcL Final       Radiology:         Assessment:         1. Open avulsion fracture of distal phalanx of finger, initial encounter                Plan:         Follow up in about 3 weeks (around 10/17/2024).    Hugo was seen today for post-op evaluation.    Diagnoses and all orders for this visit:    Open avulsion fracture of distal phalanx of finger, initial encounter        -skin flap with some evidence of death. Stable eschar in place. Sutures were removed by provider in the retirement. Recommend NWB and finger splint for protection of the distal aspect of the stump.   - We will see him back in 3 weeks for wound check. Discussed possible revision amputation needed in the future if the skin flap does not heal beneath.  -ED precautions given    The above findings, diagnostics, and treatment plan were discussed with Dr. Ruiz who is in agreement with the plan of care except as stated in additional documentation.       Sushma Petersen PA-C          Future Appointments   Date Time Provider Department Center   10/14/2024 10:15 AM Nakul Ruiz DO [facility]               "

## 2024-10-14 ENCOUNTER — OFFICE VISIT (OUTPATIENT)
Dept: ORTHOPEDICS | Facility: CLINIC | Age: 24
End: 2024-10-14
Payer: OTHER MISCELLANEOUS

## 2024-10-14 VITALS
HEART RATE: 62 BPM | SYSTOLIC BLOOD PRESSURE: 104 MMHG | DIASTOLIC BLOOD PRESSURE: 49 MMHG | BODY MASS INDEX: 19.49 KG/M2 | RESPIRATION RATE: 18 BRPM | HEIGHT: 76 IN | WEIGHT: 160.06 LBS

## 2024-10-14 DIAGNOSIS — S62.639B OPEN AVULSION FRACTURE OF DISTAL PHALANX OF FINGER, INITIAL ENCOUNTER: Primary | ICD-10-CM

## 2024-10-14 PROCEDURE — 99024 POSTOP FOLLOW-UP VISIT: CPT | Mod: ,,, | Performed by: PHYSICIAN ASSISTANT

## 2024-10-14 RX ORDER — SULFAMETHOXAZOLE AND TRIMETHOPRIM 800; 160 MG/1; MG/1
1 TABLET ORAL 2 TIMES DAILY
Qty: 20 TABLET | Refills: 0 | Status: SHIPPED | OUTPATIENT
Start: 2024-10-14 | End: 2024-10-24

## 2024-10-14 NOTE — PROGRESS NOTES
"  Subjective:       Patient ID: Hugo Santacruz is a 24 y.o. male.  Chief Complaint   Patient presents with    Right Hand - Work Related Injury     7 week f/u right 5th finger partial amputation, large scab to tip.  No complaints        HPI    Patient presents for 7 week follow up right 5th finger partial amputation. Large eschar to tip of finger, very loose today. No complaints. Home at this time. Lives some distance away in [place]. Eager to return to activity. Discussed keeping clean and wearing splint to prevent injury to fragile incision site. No numbness or tingling.     ROS:  Constitutional: Denies fever chills  Eyes: No change in vision  ENT: No ringing or current infections  CV: No chest pain  Resp: No labored breathing  MSK: Pain evident at site of injury located in HPI,   Integ: No signs of abrasions or lacerations  Neuro: No numbness or tingling  Lymphatic: No swelling outside the area of injury     No current outpatient medications on file prior to visit.     No current facility-administered medications on file prior to visit.          Objective:      BP (!) 104/49   Pulse 62   Resp 18   Ht 6' 4" (1.93 m)   Wt 72.6 kg (160 lb 0.9 oz)   BMI 19.48 kg/m²   Physical Exam  General the patient is alert and oriented x3 no acute distress nontoxic-appearing appropriate affect.    Constitutional: Vital signs are examined and stable.  Resp: No signs of labored breathing      LUE: --Skin: Large eschar in place partially debrided. Well healed skin beneath except one small area of granulation tissue, remainder of eschar left in place.           -MSK: STR 5/5 AIN/PIN/Median/Radial/Ulnar motor           -Neuro:  Sensation intact to light touch C5-T1 dermatomes           -Lymphatic: No signs of lymphadenopathy, No signs of swelling,           -CV:Capillary refill is less than 2 seconds.                       Body mass index is 19.48 kg/m².  Ideal body weight: 86.8 kg (191 lb 5.7 oz)  No results found for: "HGBA1C"  Hgb " "  Date Value Ref Range Status   08/28/2024 13.2 (L) 14.0 - 18.0 g/dL Final     Hct   Date Value Ref Range Status   08/28/2024 39.8 (L) 42.0 - 52.0 % Final     No results found for: "IRON"  No components found for: "FROLATE"  No results found for: "KARGMLYC76RT"  WBC   Date Value Ref Range Status   08/28/2024 7.61 4.50 - 11.50 x10(3)/mcL Final       Radiology: no x rays taken today.         Assessment:         1. Open avulsion fracture of distal phalanx of finger, initial encounter  sulfamethoxazole-trimethoprim 800-160mg (BACTRIM DS) 800-160 mg Tab              Plan:         Follow up in about 4 weeks (around 11/11/2024).    Hugo was seen today for work related injury.    Diagnoses and all orders for this visit:    Open avulsion fracture of distal phalanx of finger, initial encounter  -     sulfamethoxazole-trimethoprim 800-160mg (BACTRIM DS) 800-160 mg Tab; Take 1 tablet by mouth 2 (two) times daily. for 10 days        -Repeat wound check in 4-6 weeks. Bactrim DS x 10 days for prophylaxis since returning to work/activity and eschar remaining.   -alumifoam splint provided today to protect distal amputation site. Discussed fevers, chills, erythema, drainage. Or other concerns to report to the nearest ED for evaluation. Call if he needs follow up sooner.   -ED precautions given    The above findings, diagnostics, and treatment plan were discussed with Dr. Ruiz who is in agreement with the plan of care except as stated in additional documentation.       Sushma Petersen PA-C          Future Appointments   Date Time Provider Department Center   11/7/2024 10:15 AM [facility]             "

## 2024-10-14 NOTE — LETTER
[facility]  [address] [place] [address]. [address] [place], [address]  Phone: [phone]  Fax: [phone]    Name:Hugo Santacruz  :2000   Date:10/14/2024     PATIENT IS UNABLE TO WORK AS OF: 2024  [_] Pending treatment.  [XX] For approximately [_] Days [_] Weeks [3-4] Months  [_] Pending diagnostic testing.  [_] Pending surgical treatment.  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  Although a sedentary job is defined as one which involved sitting, a certain amount of walking and standing are required only occasionally and other sedentary criteria are met.    [_] LIGHT WORK: Lifting 20 pounds with frequent lifting and/or carrying objects weighing up to 10 pounds.  Even though the weight lifted may be only a negotiable amount, a job is in the category when it involves sitting most of the time with a degree of pushing/pulling of arm and/or leg controls.    [_] MEDIUM WORK: Lifting of 50 pounds maximum with frequent lifting and/or carrying of objects up to 25 pounds.    [_] HEAVY WORK: Lifting of 100 pounds maximum with frequent lifting and/or carrying objects up to 50 pounds.    [_] VERY HEAVY WORK: Lifting objects in excess of 100 pounds with frequent lifting and/or carrying of objects weighing 50 pounds or more.    [_] REGULAR DUTY: [_] No Restrictions. [_] With Restrictions (See comments below):    COMMENTS     Nakul Ruiz DO

## 2024-10-25 ENCOUNTER — TELEPHONE (OUTPATIENT)
Dept: ORTHOPEDICS | Facility: CLINIC | Age: 24
End: 2024-10-25
Payer: OTHER MISCELLANEOUS

## 2024-11-13 ENCOUNTER — OFFICE VISIT (OUTPATIENT)
Dept: ORTHOPEDICS | Facility: CLINIC | Age: 24
End: 2024-11-13
Payer: OTHER MISCELLANEOUS

## 2024-11-13 VITALS
DIASTOLIC BLOOD PRESSURE: 84 MMHG | WEIGHT: 160.06 LBS | HEIGHT: 76 IN | HEART RATE: 71 BPM | BODY MASS INDEX: 19.49 KG/M2 | SYSTOLIC BLOOD PRESSURE: 117 MMHG

## 2024-11-13 DIAGNOSIS — S62.639B OPEN AVULSION FRACTURE OF DISTAL PHALANX OF FINGER, INITIAL ENCOUNTER: Primary | ICD-10-CM

## 2024-11-13 NOTE — PROGRESS NOTES
"  Subjective:       Patient ID: Hugo Sanatcruz is a 24 y.o. male.  Chief Complaint   Patient presents with    Follow-up     11 wks, Rt 5th finger, revision amputation sx 8/28/24, pt states sensitive to the touch at times, still having some pain but manageable, no other complaints,         Follow-up        Patient presents for 11 week follow up right 5th finger partial amputation. Healing well overall, pain improved although does have some sensitivity to the tip of the digit at the amputation site. Eschar completley gone, skin fully healed over fingertip. No numbness or tingling.     ROS:  Constitutional: Denies fever chills  Eyes: No change in vision  ENT: No ringing or current infections  CV: No chest pain  Resp: No labored breathing  MSK: Pain evident at site of injury located in HPI,   Integ: No signs of abrasions or lacerations  Neuro: No numbness or tingling  Lymphatic: No swelling outside the area of injury     No current outpatient medications on file prior to visit.     No current facility-administered medications on file prior to visit.          Objective:      /84   Pulse 71   Ht 6' 4" (1.93 m)   Wt 72.6 kg (160 lb 0.9 oz)   BMI 19.48 kg/m²   Physical Exam  General the patient is alert and oriented x3 no acute distress nontoxic-appearing appropriate affect.    Constitutional: Vital signs are examined and stable.  Resp: No signs of labored breathing      LUE: --Skin: Large eschar now healed. New dermis formed over this area.Clean and dry, well healed.            -MSK: STR 5/5 AIN/PIN/Median/Radial/Ulnar motor           -Neuro:  Sensation intact to light touch C5-T1 dermatomes           -Lymphatic: No signs of lymphadenopathy, No signs of swelling,           -CV:Capillary refill is less than 2 seconds.                       Body mass index is 19.48 kg/m².  Ideal body weight: 86.8 kg (191 lb 5.7 oz)  No results found for: "HGBA1C"  Hgb   Date Value Ref Range Status   08/28/2024 13.2 (L) 14.0 - 18.0 " "g/dL Final     Hct   Date Value Ref Range Status   08/28/2024 39.8 (L) 42.0 - 52.0 % Final     No results found for: "IRON"  No components found for: "FROLATE"  No results found for: "CBCBCAET97SE"  WBC   Date Value Ref Range Status   08/28/2024 7.61 4.50 - 11.50 x10(3)/mcL Final       Radiology: no x rays taken today.         Assessment:         1. Open avulsion fracture of distal phalanx of finger, initial encounter                  Plan:         Follow up if symptoms worsen or fail to improve.    Hugo was seen today for follow-up.    Diagnoses and all orders for this visit:    Open avulsion fracture of distal phalanx of finger, initial encounter          -Has done well keeping clean and dry. Has continued to heal. Now has full healing of the amputation site.   -has returned to work. No longer using splint for protection of the fingertip. Will provide work note today. Happy with his progress. He will call for follow up as needed should he have any concerns in the future. Discussed possible neuroma at fingertip.   -ED precautions given    Nakul Ruiz DO personally performed the services described in this documentation, including but not limited to patient's history, physical examination, and assessment and plan of care. All medical record entries made by Sushma Petersen PA-C were performed at his direction and in his presence. The medical record was reviewed and is accurate and complete.      Nakul Ruiz DO  Orthopedic Trauma Surgery          No future appointments.              "

## 2024-11-13 NOTE — LETTER
[facility]  [address] [place] [address]. 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  Although a sedentary job is defined as one which involved sitting, a certain amount of walking and standing are required only occasionally and other sedentary criteria are met.    [_] LIGHT WORK: Lifting 20 pounds with frequent lifting and/or carrying objects weighing up to 10 pounds.  Even though the weight lifted may be only a negotiable amount, a job is in the category when it involves sitting most of the time with a degree of pushing/pulling of arm and/or leg controls.    [_] MEDIUM WORK: Lifting of 50 pounds maximum with frequent lifting and/or carrying of objects up to 25 pounds.    [_] HEAVY WORK: Lifting of 100 pounds maximum with frequent lifting and/or carrying objects up to 50 pounds.    [_] VERY HEAVY WORK: Lifting objects in excess of 100 pounds with frequent lifting and/or carrying of objects weighing 50 pounds or more.    [XX] REGULAR DUTY: [XX] No Restrictions. [_] With Restrictions (See comments below):    COMMENTS     KEATON LYONS DO

## (undated) DEVICE — Device

## (undated) DEVICE — GLOVE PROTEXIS HYDROGEL SZ9

## (undated) DEVICE — DRAPE HAND STERILE

## (undated) DEVICE — TAPE SILK 3IN

## (undated) DEVICE — COVER FULLGUARD SHOE HIGH-TOP

## (undated) DEVICE — PADDING 4X4YD SPECIALIST100

## (undated) DEVICE — DRAPE STERI U-SHAPED 47X51IN

## (undated) DEVICE — GLOVE PROTEXIS BLUE LATEX 9

## (undated) DEVICE — GLOVE SIGNATURE MICRO LTX 6

## (undated) DEVICE — GLOVE PROTEXIS NEU-THERA SZ6

## (undated) DEVICE — IRRIGATION SET Y-TYPE TUR/BLAD

## (undated) DEVICE — DRESSING XEROFORM 5X9IN

## (undated) DEVICE — ELECTRODE REM POLYHESIVE II

## (undated) DEVICE — GOWN SMARTGOWN 3XL XLONG

## (undated) DEVICE — PAD CAST 6X4YD SPECIALISTIC